# Patient Record
Sex: MALE | Race: WHITE | NOT HISPANIC OR LATINO
[De-identification: names, ages, dates, MRNs, and addresses within clinical notes are randomized per-mention and may not be internally consistent; named-entity substitution may affect disease eponyms.]

---

## 2018-09-29 ENCOUNTER — APPOINTMENT (OUTPATIENT)
Dept: RADIOLOGY | Facility: IMAGING CENTER | Age: 48
End: 2018-09-29
Payer: COMMERCIAL

## 2018-09-29 ENCOUNTER — OUTPATIENT (OUTPATIENT)
Dept: OUTPATIENT SERVICES | Facility: HOSPITAL | Age: 48
LOS: 1 days | End: 2018-09-29
Payer: COMMERCIAL

## 2018-09-29 DIAGNOSIS — Z00.8 ENCOUNTER FOR OTHER GENERAL EXAMINATION: ICD-10-CM

## 2018-09-29 PROBLEM — Z00.00 ENCOUNTER FOR PREVENTIVE HEALTH EXAMINATION: Status: ACTIVE | Noted: 2018-09-29

## 2018-09-29 PROCEDURE — 70260 X-RAY EXAM OF SKULL: CPT | Mod: 26

## 2018-09-29 PROCEDURE — 70260 X-RAY EXAM OF SKULL: CPT

## 2020-07-30 ENCOUNTER — APPOINTMENT (OUTPATIENT)
Dept: SURGERY | Facility: CLINIC | Age: 50
End: 2020-07-30
Payer: COMMERCIAL

## 2020-07-30 VITALS
BODY MASS INDEX: 28.54 KG/M2 | SYSTOLIC BLOOD PRESSURE: 161 MMHG | WEIGHT: 184 LBS | HEIGHT: 67.5 IN | HEART RATE: 108 BPM | DIASTOLIC BLOOD PRESSURE: 101 MMHG

## 2020-07-30 DIAGNOSIS — Z86.39 PERSONAL HISTORY OF OTHER ENDOCRINE, NUTRITIONAL AND METABOLIC DISEASE: ICD-10-CM

## 2020-07-30 DIAGNOSIS — Z78.9 OTHER SPECIFIED HEALTH STATUS: ICD-10-CM

## 2020-07-30 PROCEDURE — 99203 OFFICE O/P NEW LOW 30 MIN: CPT

## 2020-07-30 RX ORDER — LEVOTHYROXINE SODIUM 0.17 MG/1
TABLET ORAL
Refills: 0 | Status: ACTIVE | COMMUNITY

## 2020-07-30 RX ORDER — DOXYCYCLINE HYCLATE 100 MG/1
100 TABLET ORAL
Refills: 0 | Status: ACTIVE | COMMUNITY

## 2020-07-30 NOTE — HISTORY OF PRESENT ILLNESS
[de-identified] : Pt c/o scalp mass increasing in size for several years.  denies pain or infection, drainage or history of trauma

## 2020-07-30 NOTE — CONSULT LETTER
[Please see my note below.] : Please see my note below. [Dear  ___] : Dear  [unfilled], [Consult Letter:] : I had the pleasure of evaluating your patient, [unfilled]. [Sincerely,] : Sincerely, [FreeTextEntry2] : Dr. Ciaran Silveira [Consult Closing:] : Thank you very much for allowing me to participate in the care of this patient.  If you have any questions, please do not hesitate to contact me. [FreeTextEntry3] : Jonas Goncalves MD, FACS\par System Director, Endocrine Surgery\par [facility]\par Assistant Clinical Professor of Surgery\par [facility] [employer] [facility]

## 2020-07-30 NOTE — PHYSICAL EXAM
[de-identified] : no palpable thyroid nodules [Midline] : located in midline position [Normal] : orientation to person, place, and time: normal [FreeTextEntry2] : 2.5 cm left posterior scalp mass, well circumscribed, mobile, adherent to overlying skin

## 2020-07-30 NOTE — ASSESSMENT
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 discussed options for management including observation vs excision.  risks, benefits and alternatives discussed at length.  I have discussed with the patient the anatomy of the area, the pathophysiology of the disease process and the rationale for surgery.  The attendant risks, possible complications and expected postoperative course have been discussed in detail.  I have given the patient the opportunity to ask questions, and all questions have been answered to the patient's satisfaction, and he wishes to proceed with the planned procedure.  potential for scarring, alopecia, recurrence, sensory changes discussed. \par

## 2020-08-23 DIAGNOSIS — Z01.818 ENCOUNTER FOR OTHER PREPROCEDURAL EXAMINATION: ICD-10-CM

## 2020-08-25 ENCOUNTER — APPOINTMENT (OUTPATIENT)
Dept: DISASTER EMERGENCY | Facility: CLINIC | Age: 50
End: 2020-08-25

## 2020-08-25 ENCOUNTER — OUTPATIENT (OUTPATIENT)
Dept: OUTPATIENT SERVICES | Facility: HOSPITAL | Age: 50
LOS: 1 days | End: 2020-08-25
Payer: COMMERCIAL

## 2020-08-25 VITALS
RESPIRATION RATE: 14 BRPM | HEART RATE: 67 BPM | TEMPERATURE: 98 F | DIASTOLIC BLOOD PRESSURE: 70 MMHG | OXYGEN SATURATION: 99 % | WEIGHT: 182.98 LBS | HEIGHT: 67 IN | SYSTOLIC BLOOD PRESSURE: 122 MMHG

## 2020-08-25 DIAGNOSIS — K40.90 UNILATERAL INGUINAL HERNIA, WITHOUT OBSTRUCTION OR GANGRENE, NOT SPECIFIED AS RECURRENT: Chronic | ICD-10-CM

## 2020-08-25 DIAGNOSIS — Z98.890 OTHER SPECIFIED POSTPROCEDURAL STATES: Chronic | ICD-10-CM

## 2020-08-25 DIAGNOSIS — Z90.89 ACQUIRED ABSENCE OF OTHER ORGANS: Chronic | ICD-10-CM

## 2020-08-25 DIAGNOSIS — D48.5 NEOPLASM OF UNCERTAIN BEHAVIOR OF SKIN: ICD-10-CM

## 2020-08-25 DIAGNOSIS — Z90.49 ACQUIRED ABSENCE OF OTHER SPECIFIED PARTS OF DIGESTIVE TRACT: Chronic | ICD-10-CM

## 2020-08-25 LAB
ANION GAP SERPL CALC-SCNC: 14 MMO/L — SIGNIFICANT CHANGE UP (ref 7–14)
BUN SERPL-MCNC: 18 MG/DL — SIGNIFICANT CHANGE UP (ref 7–23)
CALCIUM SERPL-MCNC: 9.2 MG/DL — SIGNIFICANT CHANGE UP (ref 8.4–10.5)
CHLORIDE SERPL-SCNC: 101 MMOL/L — SIGNIFICANT CHANGE UP (ref 98–107)
CO2 SERPL-SCNC: 27 MMOL/L — SIGNIFICANT CHANGE UP (ref 22–31)
CREAT SERPL-MCNC: 1.19 MG/DL — SIGNIFICANT CHANGE UP (ref 0.5–1.3)
GLUCOSE SERPL-MCNC: 79 MG/DL — SIGNIFICANT CHANGE UP (ref 70–99)
HCT VFR BLD CALC: 45.7 % — SIGNIFICANT CHANGE UP (ref 39–50)
HGB BLD-MCNC: 14.3 G/DL — SIGNIFICANT CHANGE UP (ref 13–17)
MCHC RBC-ENTMCNC: 29.8 PG — SIGNIFICANT CHANGE UP (ref 27–34)
MCHC RBC-ENTMCNC: 31.3 % — LOW (ref 32–36)
MCV RBC AUTO: 95.2 FL — SIGNIFICANT CHANGE UP (ref 80–100)
NRBC # FLD: 0 K/UL — SIGNIFICANT CHANGE UP (ref 0–0)
PLATELET # BLD AUTO: 208 K/UL — SIGNIFICANT CHANGE UP (ref 150–400)
PMV BLD: 9.8 FL — SIGNIFICANT CHANGE UP (ref 7–13)
POTASSIUM SERPL-MCNC: 4.3 MMOL/L — SIGNIFICANT CHANGE UP (ref 3.5–5.3)
POTASSIUM SERPL-SCNC: 4.3 MMOL/L — SIGNIFICANT CHANGE UP (ref 3.5–5.3)
RBC # BLD: 4.8 M/UL — SIGNIFICANT CHANGE UP (ref 4.2–5.8)
RBC # FLD: 14.7 % — HIGH (ref 10.3–14.5)
SODIUM SERPL-SCNC: 142 MMOL/L — SIGNIFICANT CHANGE UP (ref 135–145)
WBC # BLD: 7.32 K/UL — SIGNIFICANT CHANGE UP (ref 3.8–10.5)
WBC # FLD AUTO: 7.32 K/UL — SIGNIFICANT CHANGE UP (ref 3.8–10.5)

## 2020-08-25 PROCEDURE — 93010 ELECTROCARDIOGRAM REPORT: CPT

## 2020-08-25 NOTE — H&P PST ADULT - GASTROINTESTINAL DETAILS
nontender/no distention/no masses palpable/no guarding/soft/no organomegaly/no bruit/no rebound tenderness/no rigidity/bowel sounds normal

## 2020-08-25 NOTE — H&P PST ADULT - NEGATIVE GENERAL SYMPTOMS
no fatigue/no fever/no weight gain/no polyphagia/no chills/no polydipsia/no sweating/no anorexia/no weight loss/no polyuria/no malaise

## 2020-08-25 NOTE — H&P PST ADULT - RS GEN PE MLT RESP DETAILS PC
no chest wall tenderness/no rhonchi/no wheezes/respirations non-labored/no rales/clear to auscultation bilaterally/no intercostal retractions/breath sounds equal/good air movement

## 2020-08-25 NOTE — H&P PST ADULT - NSICDXPASTSURGICALHX_GEN_ALL_CORE_FT
PAST SURGICAL HISTORY:  History of hip surgery resurfacing left 2015, right 2019    Inguinal hernia b/l 1968    S/P adenoidectomy 1973    S/P appendectomy 2006    S/P rotator cuff repair 2005

## 2020-08-25 NOTE — H&P PST ADULT - NEGATIVE CARDIOVASCULAR SYMPTOMS
no peripheral edema/no chest pain/no dyspnea on exertion/no claudication/no palpitations/no paroxysmal nocturnal dyspnea/no orthopnea

## 2020-08-25 NOTE — H&P PST ADULT - NEGATIVE HEMATOLOGY SYMPTOMS
Sushila De La O, pt mother is requesting a change to a [place] [facility] specialist that will accept the pt insurance. [facility]) P [phone]    From [facility] no gum bleeding/no skin lumps/no nose bleeding

## 2020-08-25 NOTE — H&P PST ADULT - NSICDXPASTMEDICALHX_GEN_ALL_CORE_FT
PAST MEDICAL HISTORY:  Hypothyroidism     Osteoarthritis PAST MEDICAL HISTORY:  Hypothyroidism     Neoplasm of uncertain behavior of skin     Osteoarthritis     Rosacea

## 2020-08-25 NOTE — H&P PST ADULT - HISTORY OF PRESENT ILLNESS
49y/o male scheduled for excision scalp mass on 8/28/2020.  Pt states, "has mass on back of head for the past year, increasing in size.  Was informed its was a cyst."

## 2020-08-25 NOTE — H&P PST ADULT - NEGATIVE BREAST SYMPTOMS
no breast lump L/no nipple discharge R/no breast tenderness L/no breast tenderness R/no breast lump R/no nipple discharge L

## 2020-08-25 NOTE — H&P PST ADULT - NEGATIVE GENERAL GENITOURINARY SYMPTOMS
no hematuria/no flank pain R/normal urinary frequency/no flank pain L/no bladder infections/no dysuria/no urinary hesitancy

## 2020-08-25 NOTE — H&P PST ADULT - PRIMARY CARE PROVIDER
Dr. Louise Last pmd [phone], fax [phone] Dr. Wagoner Last pmd [phone], fax [phone]

## 2020-08-26 LAB — SARS-COV-2 N GENE NPH QL NAA+PROBE: NOT DETECTED

## 2020-08-27 ENCOUNTER — TRANSCRIPTION ENCOUNTER (OUTPATIENT)
Age: 50
End: 2020-08-27

## 2020-08-27 VITALS
RESPIRATION RATE: 16 BRPM | HEIGHT: 67 IN | OXYGEN SATURATION: 100 % | TEMPERATURE: 97 F | SYSTOLIC BLOOD PRESSURE: 125 MMHG | WEIGHT: 182.98 LBS | DIASTOLIC BLOOD PRESSURE: 77 MMHG | HEART RATE: 70 BPM

## 2020-08-27 NOTE — ASU PREOPERATIVE ASSESSMENT, ADULT ([facility] ONLY) - TRANSPORT TO OR VIA
Pharmacist Admission Medication Reconciliation Pending Note    Prior to Admission Medications were reviewed by the pharmacist and pended for provider review during admission medication reconciliation.    Medications were pended by the pharmacist at this time as follows:    Pended Admission Order Reconciliation Actions     Order Name Action Reordered As    Calcium Citrate-Vitamin D 500-250 MG-UNIT PACK Do Not Order for Admission     esomeprazole (NEXIUM) 40 MG capsule Order for Admission pantoprazole (PROTONIX) EC tablet 40 mg    DULoxetine (CYMBALTA) 60 MG capsule Order for Admission DULoxetine (CYMBALTA) capsule 60 mg    clonazePAM (KLONOPIN) 0.5 MG tablet Order for Admission clonazePAM (KlonoPIN) tablet 0.5 mg    escitalopram (LEXAPRO) 10 MG tablet Order for Admission escitalopram (LEXAPRO) tablet 15 mg    lisinopril-hydrochlorothiazide (PRINZIDE,ZESTORETIC) 20-12.5 MG per tablet Order for Admission lisinopril-hydrochlorothiazide (PRINZIDE,ZESTORETIC) 20-12.5 MG per tablet 1 tablet            Orders Pended To Continue For Hospital Stay     ID Description Pended By When Reason    [id number] clonazePAM (KlonoPIN) tablet 0.5 mg-DAILY PRN Mildred Doty, [facility] 01/10/17 1640     [id number] DULoxetine (CYMBALTA) capsule 60 mg-DAILY Mildred Doty, [facility] 01/10/17 1640     [id number] escitalopram (LEXAPRO) tablet 15 mg-DAILY Mildredmadhavi Doty, [facility] 01/10/17 1640     [id number] pantoprazole (PROTONIX) EC tablet 40 mg-DAILY BEFORE BREAKFAST Mildred Doty, [facility] 01/10/17 1640     [id number] lisinopril-hydrochlorothiazide (PRINZIDE,ZESTORETIC) 20-12.5 MG per tablet 1 tablet-DAILY Mildred Doty, [facility] 01/10/17 1640             Pharmacist Notations:     1) Did not continue non-essential PRN and OTC medications.  2) Did not address naproxen, this is a new prescription that the patient has not started taking yet.    Last edited by Mildred Doty [facility] on 01/10/17 at 1641          Orders that are ultimately  reconciled and signed during admission medication reconciliation may differ from the pended actions above.    Please contact the pharmacist for questions.    Mildred Doty RPH  1/10/2017 4:41 PM   ambulatory

## 2020-08-28 ENCOUNTER — RESULT REVIEW (OUTPATIENT)
Age: 50
End: 2020-08-28

## 2020-08-28 ENCOUNTER — OUTPATIENT (OUTPATIENT)
Dept: OUTPATIENT SERVICES | Facility: HOSPITAL | Age: 50
LOS: 1 days | Discharge: HOME | End: 2020-08-28
Payer: COMMERCIAL

## 2020-08-28 ENCOUNTER — APPOINTMENT (OUTPATIENT)
Dept: SURGERY | Facility: HOSPITAL | Age: 50
End: 2020-08-28

## 2020-08-28 VITALS
TEMPERATURE: 98 F | HEART RATE: 57 BPM | DIASTOLIC BLOOD PRESSURE: 82 MMHG | OXYGEN SATURATION: 100 % | SYSTOLIC BLOOD PRESSURE: 135 MMHG | RESPIRATION RATE: 16 BRPM

## 2020-08-28 DIAGNOSIS — Z90.89 ACQUIRED ABSENCE OF OTHER ORGANS: Chronic | ICD-10-CM

## 2020-08-28 DIAGNOSIS — K40.90 UNILATERAL INGUINAL HERNIA, WITHOUT OBSTRUCTION OR GANGRENE, NOT SPECIFIED AS RECURRENT: Chronic | ICD-10-CM

## 2020-08-28 DIAGNOSIS — D48.5 NEOPLASM OF UNCERTAIN BEHAVIOR OF SKIN: ICD-10-CM

## 2020-08-28 DIAGNOSIS — Z90.49 ACQUIRED ABSENCE OF OTHER SPECIFIED PARTS OF DIGESTIVE TRACT: Chronic | ICD-10-CM

## 2020-08-28 DIAGNOSIS — Z98.890 OTHER SPECIFIED POSTPROCEDURAL STATES: Chronic | ICD-10-CM

## 2020-08-28 PROBLEM — L71.9 ROSACEA, UNSPECIFIED: Chronic | Status: ACTIVE | Noted: 2020-08-25

## 2020-08-28 PROCEDURE — 11424 EXC H-F-NK-SP B9+MARG 3.1-4: CPT

## 2020-08-28 PROCEDURE — 88304 TISSUE EXAM BY PATHOLOGIST: CPT | Mod: 26

## 2020-08-28 RX ORDER — LEVOTHYROXINE SODIUM 125 MCG
1 TABLET ORAL
Qty: 0 | Refills: 0 | DISCHARGE

## 2020-08-28 RX ORDER — SODIUM CHLORIDE 9 MG/ML
1000 INJECTION, SOLUTION INTRAVENOUS
Refills: 0 | Status: DISCONTINUED | OUTPATIENT
Start: 2020-08-28 | End: 2020-09-14

## 2020-08-28 RX ORDER — ACETAMINOPHEN 500 MG
650 TABLET ORAL EVERY 6 HOURS
Refills: 0 | Status: DISCONTINUED | OUTPATIENT
Start: 2020-08-28 | End: 2020-09-14

## 2020-08-28 NOTE — ASU DISCHARGE PLAN (ADULT/PEDIATRIC) - CALL YOUR DOCTOR IF YOU HAVE ANY OF THE FOLLOWING:
Bleeding that does not stop/Pain not relieved by Medications/Swelling that gets worse Pain not relieved by Medications/Swelling that gets worse/Fever greater than (need to indicate Fahrenheit or Celsius)/Bleeding that does not stop/Wound/Surgical Site with redness, or foul smelling discharge or pus

## 2020-08-28 NOTE — ASU DISCHARGE PLAN (ADULT/PEDIATRIC) - CARE PROVIDER_API CALL
Jonas Goncalves  PLASTIC SURGERY  [address] [place] [address] [address]  [place] [address]  Phone: [phone]  Fax: [phone]  Follow Up Time: Jonas Goncalves  PLASTIC SURGERY  [address] [place] [address]  Phone: [phone]  Fax: [phone]  Follow Up Time: 1 week

## 2020-08-28 NOTE — BRIEF OPERATIVE NOTE - NSICDXBRIEFPROCEDURE_GEN_ALL_CORE_FT
PROCEDURES:  Excision, lesion, benign, scalp, 1.1 cm to 2.0 cm in diameter including margins 28-Aug-2020 09:48:04  Chayito Glynn

## 2020-08-28 NOTE — ASU PREOP CHECKLIST - IDENTIFICATION BAND VERIFIED
Fax received from [facility] regarding Pt  Pt location at SNF: [address] wing  Fax sent by: Arias    Fax regarding concern: Update    Assessment:         Any recommendations or new orders?       done

## 2020-09-02 LAB — SURGICAL PATHOLOGY STUDY: SIGNIFICANT CHANGE UP

## 2020-09-15 ENCOUNTER — APPOINTMENT (OUTPATIENT)
Dept: SURGERY | Facility: CLINIC | Age: 50
End: 2020-09-15
Payer: COMMERCIAL

## 2020-09-15 DIAGNOSIS — R22.0 LOCALIZED SWELLING, MASS AND LUMP, HEAD: ICD-10-CM

## 2020-09-15 PROCEDURE — 99213 OFFICE O/P EST LOW 20 MIN: CPT

## 2020-09-15 NOTE — ASSESSMENT
[FreeTextEntry1] : s/p scalp mass excision\par daily care\par suture removal\par path reviewed\par f/u 4 months

## 2021-01-14 ENCOUNTER — NON-APPOINTMENT (OUTPATIENT)
Age: 51
End: 2021-01-14

## 2021-01-14 ENCOUNTER — APPOINTMENT (OUTPATIENT)
Dept: SURGERY | Facility: CLINIC | Age: 51
End: 2021-01-14

## 2022-04-13 NOTE — H&P PST ADULT - NSWEIGHTCALCTOOLDRUG_GEN_A_CORE
Airway  Performed by: Suman Sims MD  Authorized by: Suman Sims MD     Final Airway Type:  Endotracheal airway  Final Endotracheal Airway*:  ETT  ETT Size (mm)*:  7.0  Cuff*:  Regular  Technique Used for Successful ETT Placement:  Direct laryngoscopy  Devices/Methods Used in Placement*:  Mask  Intubation Procedure*:  Preoxygenation, ETCO2, Atraumatic, Dentition Unchanged and Pharynx Clear  Insertion Site:  Oral  Blade Type*:  MAC  Blade Size*:  3  Secured at (cm)*:  22  Placement Verified by: auscultation and capnometry    Glottic View*:  1 - full view of glottis  Attempts*:  1  Location:  OR  Urgency:  Elective  Difficult Airway: No    Indications for Airway Management:  Anesthesia  Mask Difficulty Assessment:  1 - vent by mask  Performed By:  Anesthesiologist  Anesthesiologist:  Suman Sims MD  Start Time: 4/13/2022 10:54 AM        
 used

## 2023-05-31 ENCOUNTER — APPOINTMENT (OUTPATIENT)
Dept: RADIOLOGY | Facility: CLINIC | Age: 53
End: 2023-05-31
Payer: COMMERCIAL

## 2023-05-31 PROCEDURE — 73030 X-RAY EXAM OF SHOULDER: CPT | Mod: RT

## 2023-05-31 PROCEDURE — 73000 X-RAY EXAM OF COLLAR BONE: CPT | Mod: RT

## 2023-05-31 PROCEDURE — 73060 X-RAY EXAM OF HUMERUS: CPT | Mod: RT

## 2023-06-06 ENCOUNTER — APPOINTMENT (OUTPATIENT)
Dept: ORTHOPEDIC SURGERY | Facility: CLINIC | Age: 53
End: 2023-06-06
Payer: COMMERCIAL

## 2023-06-06 ENCOUNTER — NON-APPOINTMENT (OUTPATIENT)
Age: 53
End: 2023-06-06

## 2023-06-06 VITALS — HEIGHT: 67 IN | BODY MASS INDEX: 28.88 KG/M2 | WEIGHT: 184 LBS

## 2023-06-06 PROCEDURE — 99204 OFFICE O/P NEW MOD 45 MIN: CPT

## 2023-06-06 RX ORDER — NAPROXEN 500 MG/1
500 TABLET ORAL
Qty: 60 | Refills: 0 | Status: ACTIVE | COMMUNITY
Start: 2023-06-06 | End: 1900-01-01

## 2023-06-06 NOTE — DATA REVIEWED
[Outside X-rays] : outside x-rays [Right] : of the right [Shoulder] : shoulder [I independently reviewed and interpreted images and report] : I independently reviewed and interpreted images and report [FreeTextEntry1] : no fx/dislocations

## 2023-06-06 NOTE — HISTORY OF PRESENT ILLNESS
[de-identified] : 6/6/23: 51 yo RHD male with right shoulder pain since may 2023. Denies specific injury but states he woke up with pain. He tried resting from exercise. He had increased pain since 5/30/23. There is throbbing pain deep in his shoulder. He has pain at night. He went to [facility] for xrays and was given naproxen.

## 2023-06-06 NOTE — ASSESSMENT
[FreeTextEntry1] : R RC tendonitis.\par Activity modification.\par Ice.\par Trial of PT.\par Naproxen is helping, will continue prn.\par Consider SA injection vs MRI.\par RTO 6 weeks.\par

## 2023-06-06 NOTE — PHYSICAL EXAM
[Right] : right shoulder [5 ___] : forward flexion 5[unfilled]/5 [5___] : internal rotation 5[unfilled]/5 [] : positive impingement testing [FreeTextEntry9] : \par ER 50

## 2023-06-08 ENCOUNTER — NON-APPOINTMENT (OUTPATIENT)
Age: 53
End: 2023-06-08

## 2023-06-13 ENCOUNTER — APPOINTMENT (OUTPATIENT)
Dept: ORTHOPEDIC SURGERY | Facility: CLINIC | Age: 53
End: 2023-06-13

## 2023-07-06 ENCOUNTER — RX RENEWAL (OUTPATIENT)
Age: 53
End: 2023-07-06

## 2023-08-03 ENCOUNTER — APPOINTMENT (OUTPATIENT)
Dept: ORTHOPEDIC SURGERY | Facility: CLINIC | Age: 53
End: 2023-08-03
Payer: COMMERCIAL

## 2023-08-03 VITALS — WEIGHT: 184 LBS | HEIGHT: 67 IN | BODY MASS INDEX: 28.88 KG/M2

## 2023-08-03 DIAGNOSIS — M75.81 OTHER SHOULDER LESIONS, RIGHT SHOULDER: ICD-10-CM

## 2023-08-03 PROCEDURE — 99213 OFFICE O/P EST LOW 20 MIN: CPT

## 2023-08-03 NOTE — PHYSICAL EXAM
[Right] : right shoulder [5 ___] : forward flexion 5[unfilled]/5 [5___] : internal rotation 5[unfilled]/5 [] : positive Diana [FreeTextEntry9] : \par  ER 50

## 2023-08-03 NOTE — ASSESSMENT
[FreeTextEntry1] : R RC tendonitis. Activity modification. Ice. He is in PT. Continue HEP. Naproxen prn. Recommend MRI to eval RCT. RTO after MRI.

## 2023-08-03 NOTE — HISTORY OF PRESENT ILLNESS
[de-identified] : 8/3/23: Here for follow up. He has been in PT with some relief. He continues to have pain anteriorly with reaching. He tried naproxen.  6/6/23: 51 yo RHD male with right shoulder pain since may 2023. Denies specific injury but states he woke up with pain. He tried resting from exercise. He had increased pain since 5/30/23. There is throbbing pain deep in his shoulder. He has pain at night. He went to [facility] for xrays and was given naproxen.

## 2023-08-22 ENCOUNTER — APPOINTMENT (OUTPATIENT)
Dept: ORTHOPEDIC SURGERY | Facility: CLINIC | Age: 53
End: 2023-08-22
Payer: COMMERCIAL

## 2023-08-22 VITALS — WEIGHT: 184 LBS | BODY MASS INDEX: 28.88 KG/M2 | HEIGHT: 67 IN

## 2023-08-22 PROCEDURE — 99214 OFFICE O/P EST MOD 30 MIN: CPT | Mod: 25

## 2023-08-22 PROCEDURE — 20611 DRAIN/INJ JOINT/BURSA W/US: CPT | Mod: RT

## 2023-08-22 PROCEDURE — J3490M: CUSTOM

## 2023-08-22 NOTE — ASSESSMENT
[FreeTextEntry1] : R RCT.  MRI reviewed: RCT with retraction, labral tearing. There is some retraction and degeneration in the tendon. Discussed op versus non op tx, including the r/b/a/c of both. Discussed rehab and recovery after SA, SAD, acromioplasty, synovectomy, GHD, RCR. Discussed possible biceps tenotomy pending evaluation intra op.  Discussed that the tear may not be repairable.  He is in PT. Continue HEP. Naproxen prn. R SA injection. RTO 6-8 weeks.  Procedure Note: Large Joint Injection was performed because of pain and inflammation, failure of conservative treatment.   Medications: Depo-Medrol: 1 cc, 80 mg. Lidocaine: 2 cc, 1%.  Marcaine: 2 cc, .25%.   Medication was injected in the right subacromial space. Patient has tried OTC's including aspirin, Ibuprofen, Aleve etc or prescription NSAIDs, and/or exercises at home and/ or physical therapy without satisfactory response. The risks, benefits, and alternatives to cortisone injection were explained in full to the patient. Risks outlined include but are not limited to infection, sepsis, bleeding, scarring, skin discoloration, temporary increase in pain, syncopal episode, failure to resolve symptoms, allergic reaction, symptom recurrence, and elevation of blood sugar in diabetics. Patient understood the risks. All questions were answered. After discussion of options, patient requested an injection. Oral informed consent was obtained and sterile prep of the injection site was performed using alcohol. Sterile technique was utilized for the procedure including the preparation of the solutions used for the injection. Ethyl chloride spray was used topically.  Sterile technique used. Patient tolerated procedure well. Post Procedure Instructions: Patient was advised to call if redness, pain, or fever occur and apply ice for 15 min. out of every hour for the next 12-24 hours as tolerated. patient was advised to rest the joint(s) for 2 days.  Ultrasound Guidance was used for the following reasons: for prior failure or difficult injection and to visualize tearing and inflammation. Ultrasound guided injection was performed of the shoulder, visualization of the needle and placement of injection was performed without complication.

## 2023-08-22 NOTE — DATA REVIEWED
[MRI] : MRI [Right] : of the right [Shoulder] : shoulder [I independently reviewed and interpreted images and report] : I independently reviewed and interpreted images and report [FreeTextEntry1] : RCT with retraction, labral tearing

## 2023-08-22 NOTE — HISTORY OF PRESENT ILLNESS
[de-identified] : 8/22/23: Here for MRI R shoulder  MRI R shoulder: 1. AC joint arthrosis. Supraspinatus and infraspinatus tendinopathy and fraying with diffuse moderate grade articular and interstitial tear supraspinatus mid to posterior insertion extending to the myotendinous junction and 18 mm full-thickness tear anterior insertion. No muscle atrophy. 2. Fraying and tear of the superior labrum fraying of the anterior inferior labrum. Biceps tendinopathy with interstitial tearing at the anchor and tenosynovitis. 3. Subscapularis tendinopathy tendinopathy and fraying with low-grade tear at and proximal to the insertion. 4. Joint effusion. No fracture  8/3/23: Here for follow up. He has been in PT with some relief. He continues to have pain anteriorly with reaching. He tried naproxen.  6/6/23: 51 yo RHD male with right shoulder pain since may 2023. Denies specific injury but states he woke up with pain. He tried resting from exercise. He had increased pain since 5/30/23. There is throbbing pain deep in his shoulder. He has pain at night. He went to [facility] for xrays and was given naproxen.

## 2024-01-18 ENCOUNTER — APPOINTMENT (OUTPATIENT)
Dept: ORTHOPEDIC SURGERY | Facility: CLINIC | Age: 54
End: 2024-01-18
Payer: COMMERCIAL

## 2024-01-18 VITALS — BODY MASS INDEX: 28.88 KG/M2 | HEIGHT: 67 IN | WEIGHT: 184 LBS

## 2024-01-18 PROCEDURE — 99213 OFFICE O/P EST LOW 20 MIN: CPT

## 2024-01-18 NOTE — PHYSICAL EXAM
[5 ___] : forward flexion 5[unfilled]/5 [5___] : internal rotation 5[unfilled]/5 [] : positive Diana [Bilateral] : shoulder bilaterally [FreeTextEntry9] : FE: 140 b/l ER; 40 b/l [de-identified] : weakness in FE, ER on the left.

## 2024-01-18 NOTE — HISTORY OF PRESENT ILLNESS
[de-identified] : 1/18/24:  Here for follow up.  He reports he injured the right shoulder two weeks ago he was lifting a shelf in his freezer and felt pain anteirorly in the shoulder.  He reports bruising in the arm.  He is also having new pain and weakness in the left shoulder.  He reports he had surgery in 2006, SA, SAD, RCR.   8/22/23: Here for MRI R shoulder  MRI R shoulder: 1. AC joint arthrosis. Supraspinatus and infraspinatus tendinopathy and fraying with diffuse moderate grade articular and interstitial tear supraspinatus mid to posterior insertion extending to the myotendinous junction and 18 mm full-thickness tear anterior insertion. No muscle atrophy. 2. Fraying and tear of the superior labrum fraying of the anterior inferior labrum. Biceps tendinopathy with interstitial tearing at the anchor and tenosynovitis. 3. Subscapularis tendinopathy tendinopathy and fraying with low-grade tear at and proximal to the insertion. 4. Joint effusion. No fracture  8/3/23: Here for follow up. He has been in PT with some relief. He continues to have pain anteriorly with reaching. He tried naproxen.  6/6/23: 51 yo RHD male with right shoulder pain since may 2023. Denies specific injury but states he woke up with pain. He tried resting from exercise. He had increased pain since 5/30/23. There is throbbing pain deep in his shoulder. He has pain at night. He went to [facility] for xrays and was given naproxen.

## 2024-01-18 NOTE — ASSESSMENT
[FreeTextEntry1] : R RCT. Now with R proximal biceps rupture. H/o L SA, RCR.  MRI reviewed: RCT with retraction, labral tearing.  There is some retraction and degeneration in the tendon. Discussed op versus non op tx, including the r/b/a/c of both. Discussed rehab and recovery after SA, SAD, acromioplasty, synovectomy, GHD, RCR. Discussed possible biceps tenotomy pending evaluation intra op. Discussed that the tear may not be repairable. Naproxen prn. R SA injection 8/22/23 with relief.  He will start PT for the L. MRI L shoulder if not improved.  RTO 6-8 weeks.

## 2024-03-07 ENCOUNTER — APPOINTMENT (OUTPATIENT)
Dept: ORTHOPEDIC SURGERY | Facility: CLINIC | Age: 54
End: 2024-03-07
Payer: COMMERCIAL

## 2024-03-07 VITALS — HEIGHT: 67 IN | WEIGHT: 184 LBS | BODY MASS INDEX: 28.88 KG/M2

## 2024-03-07 DIAGNOSIS — M75.121 COMPLETE ROTATOR CUFF TEAR OR RUPTURE OF RIGHT SHOULDER, NOT SPECIFIED AS TRAUMATIC: ICD-10-CM

## 2024-03-07 DIAGNOSIS — S43.431D SUPERIOR GLENOID LABRUM LESION OF RIGHT SHOULDER, SUBSEQUENT ENCOUNTER: ICD-10-CM

## 2024-03-07 PROCEDURE — 99213 OFFICE O/P EST LOW 20 MIN: CPT

## 2024-03-07 NOTE — PHYSICAL EXAM
[Bilateral] : shoulder bilaterally [5 ___] : forward flexion 5[unfilled]/5 [5___] : internal rotation 5[unfilled]/5 [] : positive Diana [de-identified] : weakness in FE, ER on the left.  [FreeTextEntry9] : FE: 140 b/l ER; 40 b/l

## 2024-03-07 NOTE — HISTORY OF PRESENT ILLNESS
[de-identified] : 3/7/24: Here for follow up. He states his right shoulder is still doing well after injection. He reports increased pain in left shoulder. He has been going to PT but pain persists.  1/18/24:  Here for follow up.  He reports he injured the right shoulder two weeks ago he was lifting a shelf in his freezer and felt pain anteirorly in the shoulder.  He reports bruising in the arm.  He is also having new pain and weakness in the left shoulder.  He reports he had surgery in 2006, SA, SAD, RCR.   8/22/23: Here for MRI R shoulder  MRI R shoulder: 1. AC joint arthrosis. Supraspinatus and infraspinatus tendinopathy and fraying with diffuse moderate grade articular and interstitial tear supraspinatus mid to posterior insertion extending to the myotendinous junction and 18 mm full-thickness tear anterior insertion. No muscle atrophy. 2. Fraying and tear of the superior labrum fraying of the anterior inferior labrum. Biceps tendinopathy with interstitial tearing at the anchor and tenosynovitis. 3. Subscapularis tendinopathy tendinopathy and fraying with low-grade tear at and proximal to the insertion. 4. Joint effusion. No fracture  8/3/23: Here for follow up. He has been in PT with some relief. He continues to have pain anteriorly with reaching. He tried naproxen.  6/6/23: 53 yo RHD male with right shoulder pain since may 2023. Denies specific injury but states he woke up with pain. He tried resting from exercise. He had increased pain since 5/30/23. There is throbbing pain deep in his shoulder. He has pain at night. He went to [facility] for xrays and was given naproxen.

## 2024-03-07 NOTE — ASSESSMENT
[FreeTextEntry1] : R RCT. Now with R proximal biceps rupture. H/o L SA, RCR.  MRI reviewed: RCT with retraction, labral tearing.  There is some retraction and degeneration in the tendon. Discussed op versus non op tx, including the r/b/a/c of both. Discussed rehab and recovery after SA, SAD, acromioplasty, synovectomy, GHD, RCR. Discussed possible biceps tenotomy pending evaluation intra op. Discussed that the tear may not be repairable. Naproxen prn. R SA injection 8/22/23 with relief.  He tried PT.  He will continue HEP for now. Recommend MRI L shoulder to eval RCT. RTO after MRI.

## 2024-03-13 ENCOUNTER — APPOINTMENT (OUTPATIENT)
Dept: ORTHOPEDIC SURGERY | Facility: CLINIC | Age: 54
End: 2024-03-13
Payer: COMMERCIAL

## 2024-03-13 PROCEDURE — 73030 X-RAY EXAM OF SHOULDER: CPT | Mod: LT

## 2024-03-13 PROCEDURE — 73010 X-RAY EXAM OF SHOULDER BLADE: CPT | Mod: LT

## 2024-03-13 PROCEDURE — 99213 OFFICE O/P EST LOW 20 MIN: CPT

## 2024-03-13 NOTE — ASSESSMENT
[FreeTextEntry1] : R RCT. Now with R proximal biceps rupture. MRI reviewed: RCT with retraction, labral tearing.  There is some retraction and degeneration in the tendon. Discussed op versus non op tx, including the r/b/a/c of both. Discussed rehab and recovery after SA, SAD, acromioplasty, synovectomy, GHD, RCR. Discussed possible biceps tenotomy pending evaluation intra op. Discussed that the tear may not be repairable. Naproxen prn. R SA injection 8/22/23 with relief.   H/o L SA, RCR.  Now with continued shoulder pain. Xrays negative for fracture, dislocation, Gh DJD.  Metal anchor from prior surgery.  He tried PT for 6 weeks.  He will continue HEP for now. Recommend MRI L shoulder to eval RCT. RTO after MRI.

## 2024-03-13 NOTE — IMAGING
[Left] : left shoulder [There are no fractures, subluxations or dislocations. No significant abnormalities are seen] : There are no fractures, subluxations or dislocations. No significant abnormalities are seen [FreeTextEntry1] : Metal anchor from prior surgery.

## 2024-03-13 NOTE — HISTORY OF PRESENT ILLNESS
[de-identified] : 3/13/24: Here for follow up.  He is here today for an xrays of the left shoulder.  Pain continues.  He did PT for 6 weeks ending in the  first week of March, and that was [facility] at [facility].    3/7/24: Here for follow up. He states his right shoulder is still doing well after injection. He reports increased pain in left shoulder. He has been going to PT but pain persists.  1/18/24:  Here for follow up.  He reports he injured the right shoulder two weeks ago he was lifting a shelf in his freezer and felt pain anteirorly in the shoulder.  He reports bruising in the arm.  He is also having new pain and weakness in the left shoulder.  He reports he had surgery in 2006, SA, SAD, RCR.   8/22/23: Here for MRI R shoulder  MRI R shoulder: 1. AC joint arthrosis. Supraspinatus and infraspinatus tendinopathy and fraying with diffuse moderate grade articular and interstitial tear supraspinatus mid to posterior insertion extending to the myotendinous junction and 18 mm full-thickness tear anterior insertion. No muscle atrophy. 2. Fraying and tear of the superior labrum fraying of the anterior inferior labrum. Biceps tendinopathy with interstitial tearing at the anchor and tenosynovitis. 3. Subscapularis tendinopathy tendinopathy and fraying with low-grade tear at and proximal to the insertion. 4. Joint effusion. No fracture  8/3/23: Here for follow up. He has been in PT with some relief. He continues to have pain anteriorly with reaching. He tried naproxen.  6/6/23: 53 yo RHD male with right shoulder pain since may 2023. Denies specific injury but states he woke up with pain. He tried resting from exercise. He had increased pain since 5/30/23. There is throbbing pain deep in his shoulder. He has pain at night. He went to [facility] for xrays and was given naproxen.

## 2024-03-13 NOTE — PHYSICAL EXAM
[Bilateral] : shoulder bilaterally [5 ___] : forward flexion 5[unfilled]/5 [5___] : internal rotation 5[unfilled]/5 [] : positive Diana [FreeTextEntry9] : FE: 140 b/l ER; 40 b/l [de-identified] : weakness in FE, ER on the left.

## 2024-03-14 ENCOUNTER — APPOINTMENT (OUTPATIENT)
Dept: MRI IMAGING | Facility: CLINIC | Age: 54
End: 2024-03-14

## 2024-03-20 ENCOUNTER — APPOINTMENT (OUTPATIENT)
Dept: MRI IMAGING | Facility: CLINIC | Age: 54
End: 2024-03-20

## 2024-03-26 ENCOUNTER — APPOINTMENT (OUTPATIENT)
Dept: ORTHOPEDIC SURGERY | Facility: CLINIC | Age: 54
End: 2024-03-26

## 2024-04-05 ENCOUNTER — APPOINTMENT (OUTPATIENT)
Dept: MRI IMAGING | Facility: CLINIC | Age: 54
End: 2024-04-05
Payer: COMMERCIAL

## 2024-04-05 PROCEDURE — 73221 MRI JOINT UPR EXTREM W/O DYE: CPT | Mod: LT

## 2024-04-16 ENCOUNTER — APPOINTMENT (OUTPATIENT)
Dept: ORTHOPEDIC SURGERY | Facility: CLINIC | Age: 54
End: 2024-04-16
Payer: COMMERCIAL

## 2024-04-16 VITALS — WEIGHT: 184 LBS | HEIGHT: 67 IN | BODY MASS INDEX: 28.88 KG/M2

## 2024-04-16 DIAGNOSIS — M75.82 OTHER SHOULDER LESIONS, LEFT SHOULDER: ICD-10-CM

## 2024-04-16 PROCEDURE — 99214 OFFICE O/P EST MOD 30 MIN: CPT | Mod: 25

## 2024-04-16 PROCEDURE — 20611 DRAIN/INJ JOINT/BURSA W/US: CPT | Mod: LT

## 2024-04-16 PROCEDURE — J3490M: CUSTOM

## 2024-04-16 NOTE — PHYSICAL EXAM
[Bilateral] : shoulder bilaterally [5 ___] : forward flexion 5[unfilled]/5 [5___] : internal rotation 5[unfilled]/5 [] : positive Diana [FreeTextEntry9] : FE: 140 b/l ER; 40 b/l [de-identified] : weakness in FE, ER on the left.

## 2024-04-16 NOTE — DATA REVIEWED
[MRI] : MRI [Left] : left [Shoulder] : shoulder [I independently reviewed and interpreted images and report] : I independently reviewed and interpreted images and report [FreeTextEntry1] : limited to due metal distortion, no evidence of RCT or biceps tendon.

## 2024-04-16 NOTE — ASSESSMENT
[FreeTextEntry1] : R RCT. Now with R proximal biceps rupture. MRI reviewed: RCT with retraction, labral tearing.  There is some retraction and degeneration in the tendon. Discussed op versus non op tx, including the r/b/a/c of both. Discussed rehab and recovery after SA, SAD, acromioplasty, synovectomy, GHD, RCR. Discussed possible biceps tenotomy pending evaluation intra op. Discussed that the tear may not be repairable. Naproxen prn. R SA injection 8/22/23 with relief.   H/o L SA, RCR.  Now with continued shoulder pain. Xrays negative for fracture, dislocation, Gh DJD.  Metal anchor from prior surgery.  MRI L shoulder reviewed: limited to due metal distortion, no evidence of RCT. He tried PT for 6 weeks.  Continue HEP.  L SA injection. RTO prn.   Procedure Note: Large Joint Injection was performed because of pain and inflammation, failure of conservative treatment.   Medications: Depo-Medrol: 1 cc, 80 mg. Lidocaine: 2 cc, 1%.  Marcaine: 2 cc, .25%.   Medication was injected in the left subacromial space. Patient has tried OTC's including aspirin, Ibuprofen, Aleve etc or prescription NSAIDs, and/or exercises at home and/ or physical therapy without satisfactory response. The risks, benefits, and alternatives to cortisone injection were explained in full to the patient. Risks outlined include but are not limited to infection, sepsis, bleeding, scarring, skin discoloration, temporary increase in pain, syncopal episode, failure to resolve symptoms, allergic reaction, symptom recurrence, and elevation of blood sugar in diabetics. Patient understood the risks. All questions were answered. After discussion of options, patient requested an injection. Oral informed consent was obtained and sterile prep of the injection site was performed using alcohol. Sterile technique was utilized for the procedure including the preparation of the solutions used for the injection. Ethyl chloride spray was used topically.  Sterile technique used. Patient tolerated procedure well. Post Procedure Instructions: Patient was advised to call if redness, pain, or fever occur and apply ice for 15 min. out of every hour for the next 12-24 hours as tolerated. patient was advised to rest the joint(s) for 2 days.  Ultrasound Guidance was used for the following reasons: for prior failure or difficult injection and to visualize tearing and inflammation. Ultrasound guided injection was performed of the shoulder, visualization of the needle and placement of injection was performed without complication.

## 2024-04-16 NOTE — HISTORY OF PRESENT ILLNESS
[de-identified] : 4/16/24: Here to review MRI.  MRI L shoulder: limited to due metal distortion, no evidence of RCT or biceps tendon.   3/13/24: Here for follow up.  He is here today for an xrays of the left shoulder.  Pain continues.  He did PT for 6 weeks ending in the  first week of March, and that was here at [facility].    3/7/24: Here for follow up. He states his right shoulder is still doing well after injection. He reports increased pain in left shoulder. He has been going to PT but pain persists.  1/18/24:  Here for follow up.  He reports he injured the right shoulder two weeks ago he was lifting a shelf in his freezer and felt pain anteirorly in the shoulder.  He reports bruising in the arm.  He is also having new pain and weakness in the left shoulder.  He reports he had surgery in 2006, SA, SAD, RCR.   8/22/23: Here for MRI R shoulder  MRI R shoulder: 1. AC joint arthrosis. Supraspinatus and infraspinatus tendinopathy and fraying with diffuse moderate grade articular and interstitial tear supraspinatus mid to posterior insertion extending to the myotendinous junction and 18 mm full-thickness tear anterior insertion. No muscle atrophy. 2. Fraying and tear of the superior labrum fraying of the anterior inferior labrum. Biceps tendinopathy with interstitial tearing at the anchor and tenosynovitis. 3. Subscapularis tendinopathy tendinopathy and fraying with low-grade tear at and proximal to the insertion. 4. Joint effusion. No fracture  8/3/23: Here for follow up. He has been in PT with some relief. He continues to have pain anteriorly with reaching. He tried naproxen.  6/6/23: 53 yo RHD male with right shoulder pain since may 2023. Denies specific injury but states he woke up with pain. He tried resting from exercise. He had increased pain since 5/30/23. There is throbbing pain deep in his shoulder. He has pain at night. He went to [facility] for xrays and was given naproxen.

## 2024-05-10 ENCOUNTER — APPOINTMENT (OUTPATIENT)
Dept: PLASTIC SURGERY | Facility: CLINIC | Age: 54
End: 2024-05-10

## 2024-05-14 ENCOUNTER — TRANSCRIPTION ENCOUNTER (OUTPATIENT)
Age: 54
End: 2024-05-14

## 2024-05-16 ENCOUNTER — APPOINTMENT (OUTPATIENT)
Dept: PLASTIC SURGERY | Facility: CLINIC | Age: 54
End: 2024-05-16
Payer: COMMERCIAL

## 2024-05-16 VITALS — WEIGHT: 200 LBS | BODY MASS INDEX: 31.39 KG/M2 | HEIGHT: 67 IN

## 2024-05-16 DIAGNOSIS — L71.1 RHINOPHYMA: ICD-10-CM

## 2024-05-16 PROCEDURE — 99203 OFFICE O/P NEW LOW 30 MIN: CPT

## 2024-05-16 NOTE — HISTORY OF PRESENT ILLNESS
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 Previous treatments - Lauren treatment, isotretinoin ointment, and doxycycline.  Problem breathing due to overgrowth.  Discomfort.